# Patient Record
Sex: FEMALE | Race: WHITE | NOT HISPANIC OR LATINO
[De-identification: names, ages, dates, MRNs, and addresses within clinical notes are randomized per-mention and may not be internally consistent; named-entity substitution may affect disease eponyms.]

---

## 2017-01-03 ENCOUNTER — APPOINTMENT (OUTPATIENT)
Dept: OPHTHALMOLOGY | Facility: CLINIC | Age: 67
End: 2017-01-03

## 2017-06-06 ENCOUNTER — APPOINTMENT (OUTPATIENT)
Dept: OPHTHALMOLOGY | Facility: CLINIC | Age: 67
End: 2017-06-06

## 2017-06-06 DIAGNOSIS — H16.9 UNSPECIFIED KERATITIS: ICD-10-CM

## 2017-06-06 DIAGNOSIS — Z96.1 PRESENCE OF INTRAOCULAR LENS: ICD-10-CM

## 2017-06-13 ENCOUNTER — APPOINTMENT (OUTPATIENT)
Dept: OPHTHALMOLOGY | Facility: CLINIC | Age: 67
End: 2017-06-13

## 2018-01-05 ENCOUNTER — APPOINTMENT (OUTPATIENT)
Dept: OPHTHALMOLOGY | Facility: CLINIC | Age: 68
End: 2018-01-05
Payer: MEDICARE

## 2018-01-05 PROCEDURE — 92012 INTRM OPH EXAM EST PATIENT: CPT

## 2018-05-22 ENCOUNTER — APPOINTMENT (OUTPATIENT)
Dept: OPHTHALMOLOGY | Facility: CLINIC | Age: 68
End: 2018-05-22
Payer: MEDICARE

## 2018-05-22 PROCEDURE — 92012 INTRM OPH EXAM EST PATIENT: CPT

## 2018-06-26 ENCOUNTER — APPOINTMENT (OUTPATIENT)
Dept: OPHTHALMOLOGY | Facility: CLINIC | Age: 68
End: 2018-06-26

## 2018-09-13 ENCOUNTER — APPOINTMENT (OUTPATIENT)
Dept: HEART AND VASCULAR | Facility: CLINIC | Age: 68
End: 2018-09-13
Payer: MEDICARE

## 2018-09-13 VITALS
TEMPERATURE: 98.2 F | WEIGHT: 198 LBS | SYSTOLIC BLOOD PRESSURE: 101 MMHG | DIASTOLIC BLOOD PRESSURE: 62 MMHG | BODY MASS INDEX: 36.44 KG/M2 | HEART RATE: 79 BPM | HEIGHT: 62 IN | OXYGEN SATURATION: 99 %

## 2018-09-13 PROCEDURE — 93000 ELECTROCARDIOGRAM COMPLETE: CPT

## 2018-09-13 PROCEDURE — 99214 OFFICE O/P EST MOD 30 MIN: CPT

## 2018-09-19 ENCOUNTER — FORM ENCOUNTER (OUTPATIENT)
Age: 68
End: 2018-09-19

## 2018-09-20 ENCOUNTER — OUTPATIENT (OUTPATIENT)
Dept: OUTPATIENT SERVICES | Facility: HOSPITAL | Age: 68
LOS: 1 days | End: 2018-09-20
Payer: MEDICARE

## 2018-09-20 DIAGNOSIS — R06.09 OTHER FORMS OF DYSPNEA: ICD-10-CM

## 2018-09-20 PROCEDURE — 93351 STRESS TTE COMPLETE: CPT

## 2018-09-20 PROCEDURE — 93350 STRESS TTE ONLY: CPT | Mod: 26

## 2018-09-20 PROCEDURE — 93016 CV STRESS TEST SUPVJ ONLY: CPT

## 2018-09-20 PROCEDURE — 93325 DOPPLER ECHO COLOR FLOW MAPG: CPT | Mod: 26

## 2018-09-20 PROCEDURE — 93320 DOPPLER ECHO COMPLETE: CPT | Mod: NC,26

## 2018-09-20 PROCEDURE — 93018 CV STRESS TEST I&R ONLY: CPT

## 2018-10-01 ENCOUNTER — APPOINTMENT (OUTPATIENT)
Dept: SLEEP CENTER | Facility: HOSPITAL | Age: 68
End: 2018-10-01

## 2018-10-01 ENCOUNTER — OUTPATIENT (OUTPATIENT)
Dept: OUTPATIENT SERVICES | Facility: HOSPITAL | Age: 68
LOS: 1 days | End: 2018-10-01
Payer: MEDICARE

## 2018-10-01 DIAGNOSIS — G47.33 OBSTRUCTIVE SLEEP APNEA (ADULT) (PEDIATRIC): ICD-10-CM

## 2018-10-01 PROCEDURE — 95810 POLYSOM 6/> YRS 4/> PARAM: CPT | Mod: 26

## 2018-10-01 PROCEDURE — 95810 POLYSOM 6/> YRS 4/> PARAM: CPT

## 2018-11-07 ENCOUNTER — APPOINTMENT (OUTPATIENT)
Dept: PULMONOLOGY | Facility: CLINIC | Age: 68
End: 2018-11-07
Payer: MEDICARE

## 2018-11-07 VITALS
DIASTOLIC BLOOD PRESSURE: 88 MMHG | RESPIRATION RATE: 12 BRPM | WEIGHT: 193 LBS | OXYGEN SATURATION: 96 % | TEMPERATURE: 98.2 F | BODY MASS INDEX: 35.51 KG/M2 | SYSTOLIC BLOOD PRESSURE: 110 MMHG | HEART RATE: 71 BPM | HEIGHT: 62 IN

## 2018-11-07 DIAGNOSIS — Z23 ENCOUNTER FOR IMMUNIZATION: ICD-10-CM

## 2018-11-07 DIAGNOSIS — Z87.891 PERSONAL HISTORY OF NICOTINE DEPENDENCE: ICD-10-CM

## 2018-11-07 DIAGNOSIS — Z80.9 FAMILY HISTORY OF MALIGNANT NEOPLASM, UNSPECIFIED: ICD-10-CM

## 2018-11-07 PROCEDURE — G0008: CPT

## 2018-11-07 PROCEDURE — 90662 IIV NO PRSV INCREASED AG IM: CPT

## 2018-11-07 PROCEDURE — 99205 OFFICE O/P NEW HI 60 MIN: CPT | Mod: 25

## 2018-11-07 RX ORDER — RABEPRAZOLE SODIUM 20 MG/1
20 TABLET, DELAYED RELEASE ORAL
Refills: 0 | Status: ACTIVE | COMMUNITY
Start: 2018-11-07

## 2018-11-07 RX ORDER — QUETIAPINE 100 MG/1
100 TABLET, FILM COATED ORAL DAILY
Refills: 0 | Status: ACTIVE | COMMUNITY

## 2018-11-07 RX ORDER — CELECOXIB 200 MG/1
200 CAPSULE ORAL
Refills: 0 | Status: ACTIVE | COMMUNITY
Start: 2018-11-07

## 2018-11-07 RX ORDER — LEVOMEFOLATE/ALGAL OIL 7.5-90.314
7.5-90.314 CAPSULE ORAL
Refills: 0 | Status: ACTIVE | COMMUNITY
Start: 2018-11-07

## 2018-11-07 RX ORDER — HYOSCYAMINE SULFATE 0.12 MG/1
0.12 TABLET ORAL
Refills: 0 | Status: ACTIVE | COMMUNITY

## 2018-11-07 RX ORDER — MULTIVITAMIN
TABLET ORAL DAILY
Refills: 0 | Status: ACTIVE | COMMUNITY

## 2018-11-07 RX ORDER — RANITIDINE 300 MG/1
300 TABLET ORAL
Refills: 0 | Status: ACTIVE | COMMUNITY
Start: 2018-11-07

## 2018-11-07 RX ORDER — CLONAZEPAM 2 MG/1
2 TABLET ORAL
Refills: 0 | Status: ACTIVE | COMMUNITY
Start: 2018-11-07

## 2018-11-08 ENCOUNTER — APPOINTMENT (OUTPATIENT)
Dept: HEART AND VASCULAR | Facility: CLINIC | Age: 68
End: 2018-11-08
Payer: MEDICARE

## 2018-11-08 VITALS
HEIGHT: 61.81 IN | OXYGEN SATURATION: 98 % | TEMPERATURE: 98.4 F | SYSTOLIC BLOOD PRESSURE: 108 MMHG | BODY MASS INDEX: 36.62 KG/M2 | HEART RATE: 70 BPM | DIASTOLIC BLOOD PRESSURE: 70 MMHG | WEIGHT: 198.99 LBS

## 2018-11-08 PROCEDURE — 99214 OFFICE O/P EST MOD 30 MIN: CPT

## 2018-11-08 PROCEDURE — 93000 ELECTROCARDIOGRAM COMPLETE: CPT

## 2018-11-08 NOTE — ASSESSMENT
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  She will see Dr Coppola next week

## 2018-11-08 NOTE — PHYSICAL EXAM
[General Appearance - Well Developed] : well developed [Normal Appearance] : normal appearance [Well Groomed] : well groomed [General Appearance - Well Nourished] : well nourished [No Deformities] : no deformities [General Appearance - In No Acute Distress] : no acute distress [Normal Conjunctiva] : the conjunctiva exhibited no abnormalities [Eyelids - No Xanthelasma] : the eyelids demonstrated no xanthelasmas [Normal Oral Mucosa] : normal oral mucosa [No Oral Pallor] : no oral pallor [No Oral Cyanosis] : no oral cyanosis [Normal Jugular Venous A Waves Present] : normal jugular venous A waves present [Normal Jugular Venous V Waves Present] : normal jugular venous V waves present [No Jugular Venous Hull A Waves] : no jugular venous hull A waves [Respiration, Rhythm And Depth] : normal respiratory rhythm and effort [Exaggerated Use Of Accessory Muscles For Inspiration] : no accessory muscle use [Auscultation Breath Sounds / Voice Sounds] : lungs were clear to auscultation bilaterally [Abdomen Soft] : soft [Abdomen Tenderness] : non-tender [Abdomen Mass (___ Cm)] : no abdominal mass palpated [Abnormal Walk] : normal gait [Gait - Sufficient For Exercise Testing] : the gait was sufficient for exercise testing [Nail Clubbing] : no clubbing of the fingernails [Cyanosis, Localized] : no localized cyanosis [Petechial Hemorrhages (___cm)] : no petechial hemorrhages [] : no ischemic changes [FreeTextEntry1] : Scleraderma skin changes

## 2018-11-08 NOTE — HISTORY OF PRESENT ILLNESS
[FreeTextEntry1] : Seen by Dr Berger for exercise induced PH on stress echo.  Resting PASP 40, and post 60.  CT without RV enlargement.  PA not enlarged\par \par He suggests right heart cath.

## 2018-11-08 NOTE — REVIEW OF SYSTEMS
[Feeling Fatigued] : feeling fatigued [Blurry Vision] : blurred vision [Shortness Of Breath] : shortness of breath [see HPI] : see HPI [Joint Pain] : joint pain [Negative] : Genitourinary

## 2018-11-15 ENCOUNTER — APPOINTMENT (OUTPATIENT)
Dept: OPHTHALMOLOGY | Facility: CLINIC | Age: 68
End: 2018-11-15

## 2018-11-15 ENCOUNTER — APPOINTMENT (OUTPATIENT)
Dept: OPHTHALMOLOGY | Facility: CLINIC | Age: 68
End: 2018-11-15
Payer: MEDICARE

## 2018-11-15 ENCOUNTER — OUTPATIENT (OUTPATIENT)
Dept: OUTPATIENT SERVICES | Facility: HOSPITAL | Age: 68
LOS: 1 days | End: 2018-11-15

## 2018-11-15 DIAGNOSIS — H26.491 OTHER SECONDARY CATARACT, RIGHT EYE: ICD-10-CM

## 2018-11-15 PROCEDURE — 66821 AFTER CATARACT LASER SURGERY: CPT | Mod: RT

## 2018-11-16 DIAGNOSIS — H26.491 OTHER SECONDARY CATARACT, RIGHT EYE: ICD-10-CM

## 2018-11-19 PROBLEM — H26.491 POSTERIOR CAPSULAR OPACIFICATION, RIGHT: Status: ACTIVE | Noted: 2018-05-22

## 2018-12-04 ENCOUNTER — APPOINTMENT (OUTPATIENT)
Dept: OPHTHALMOLOGY | Facility: CLINIC | Age: 68
End: 2018-12-04
Payer: MEDICARE

## 2018-12-04 PROCEDURE — 92012 INTRM OPH EXAM EST PATIENT: CPT | Mod: 24

## 2019-01-03 ENCOUNTER — APPOINTMENT (OUTPATIENT)
Dept: HEART AND VASCULAR | Facility: CLINIC | Age: 69
End: 2019-01-03
Payer: MEDICARE

## 2019-01-03 VITALS
HEIGHT: 61.81 IN | WEIGHT: 198 LBS | HEART RATE: 78 BPM | SYSTOLIC BLOOD PRESSURE: 106 MMHG | TEMPERATURE: 98.3 F | BODY MASS INDEX: 36.44 KG/M2 | OXYGEN SATURATION: 98 % | DIASTOLIC BLOOD PRESSURE: 69 MMHG

## 2019-01-03 PROCEDURE — 99214 OFFICE O/P EST MOD 30 MIN: CPT

## 2019-01-03 NOTE — HISTORY OF PRESENT ILLNESS
[FreeTextEntry1] : Fe def without anemia.Sent to a hematologist.  Received 5 infusions of IV Fe.  Last infusion 3 weeks ago.  First 3 weekly and then 2nd 2  in 2 days.  Within 72 hours, an improved sense of well being.  Modest improvement on the treadmill as well.  On the treadmill Mets have increased from 7 to 8.5.  Ability to walk distances is markedly improved as less muscle fatigue.  Can walk 1 1/2 flights of stairs.\par \par Her hoarseness is getting worse.  glottic insufficiency.  Very paretic vocal cord.

## 2019-01-03 NOTE — REVIEW OF SYSTEMS
[Recent Weight Gain (___ Lbs)] : recent [unfilled] ~Ulb weight gain [Feeling Fatigued] : feeling fatigued [Blurry Vision] : blurred vision [Shortness Of Breath] : shortness of breath [Dyspnea on exertion] : dyspnea during exertion [Heartburn] : heartburn [Joint Pain] : joint pain [Muscle Cramps] : muscle cramps [Negative] : Neurological

## 2019-01-03 NOTE — ASSESSMENT
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   Fe def now with repletion.  Will have glottis fixed as well\par \par Plan:  To see me in 2 months and will assess timing of stress echo.  April, May, June?

## 2019-01-03 NOTE — PHYSICAL EXAM
[General Appearance - In No Acute Distress] : no acute distress [Normal Conjunctiva] : the conjunctiva exhibited no abnormalities [Eyelids - No Xanthelasma] : the eyelids demonstrated no xanthelasmas [Normal Oral Mucosa] : normal oral mucosa [No Oral Pallor] : no oral pallor [No Oral Cyanosis] : no oral cyanosis [Normal Jugular Venous A Waves Present] : normal jugular venous A waves present [Normal Jugular Venous V Waves Present] : normal jugular venous V waves present [No Jugular Venous Hull A Waves] : no jugular venous hull A waves [Respiration, Rhythm And Depth] : normal respiratory rhythm and effort [Exaggerated Use Of Accessory Muscles For Inspiration] : no accessory muscle use [Auscultation Breath Sounds / Voice Sounds] : lungs were clear to auscultation bilaterally [Heart Rate And Rhythm] : heart rate and rhythm were normal [Heart Sounds] : normal S1 and S2 [Murmurs] : no murmurs present [Abdomen Soft] : soft [Abdomen Tenderness] : non-tender [] : no hepato-splenomegaly [Abdomen Mass (___ Cm)] : no abdominal mass palpated [Abnormal Walk] : normal gait [Gait - Sufficient For Exercise Testing] : the gait was sufficient for exercise testing [FreeTextEntry1] : non pitting edema

## 2019-03-07 ENCOUNTER — APPOINTMENT (OUTPATIENT)
Dept: HEART AND VASCULAR | Facility: CLINIC | Age: 69
End: 2019-03-07
Payer: MEDICARE

## 2019-03-07 VITALS
OXYGEN SATURATION: 100 % | TEMPERATURE: 98.4 F | HEIGHT: 61.81 IN | HEART RATE: 67 BPM | WEIGHT: 184.99 LBS | DIASTOLIC BLOOD PRESSURE: 80 MMHG | SYSTOLIC BLOOD PRESSURE: 90 MMHG | BODY MASS INDEX: 34.04 KG/M2

## 2019-03-07 PROCEDURE — 99214 OFFICE O/P EST MOD 30 MIN: CPT

## 2019-03-07 NOTE — ASSESSMENT
[FreeTextEntry1] : ECG:  SR,  67\par \par Imp:  Dr sandra with scleraderma, exercise induced PH, esophageal dys,  Fe def.  Exercise is improved and symptoms with Iron infusions\par \par Plan:  will repeat stress echo for exercise induced PH

## 2019-03-07 NOTE — REVIEW OF SYSTEMS
[Feeling Fatigued] : feeling fatigued [Recent Weight Loss (___ Lbs)] : recent [unfilled] ~Ulb weight loss [Blurry Vision] : blurred vision [Shortness Of Breath] : shortness of breath [Dysphagia] : dysphagia [Joint Pain] : joint pain [Negative] : Heme/Lymph

## 2019-03-07 NOTE — HISTORY OF PRESENT ILLNESS
[FreeTextEntry1] : Last here in January.   She is good.  She feels much better.  Exercise tolerance is subjectively much better.  work in [employer]s have increased.  Walking 2 miles with new puppy. Was able to stop eating junk food.  She is down 10 lbs.  \par \par Hgb a1c5, glottic insuff taken care of by Dr Hdez of ENT, head of [place] voice unit.  Percutaneous injection to paralyzed cord to medialize it.  \par \par Transferrin sat now 34,  Ferritin 80, received 5 infusions of Ferritin.  Last Fe 1 month ago.  \par \par LDH elevated to 350 and uric acid elevated as well\par \par Had been fructose intol and now tried an apple and thus far ok.

## 2019-03-07 NOTE — PHYSICAL EXAM
[General Appearance - Well Developed] : well developed [Normal Appearance] : normal appearance [Well Groomed] : well groomed [General Appearance - Well Nourished] : well nourished [No Deformities] : no deformities [General Appearance - In No Acute Distress] : no acute distress [Normal Conjunctiva] : the conjunctiva exhibited no abnormalities [Eyelids - No Xanthelasma] : the eyelids demonstrated no xanthelasmas [Normal Oral Mucosa] : normal oral mucosa [No Oral Pallor] : no oral pallor [No Oral Cyanosis] : no oral cyanosis [Normal Jugular Venous A Waves Present] : normal jugular venous A waves present [Normal Jugular Venous V Waves Present] : normal jugular venous V waves present [No Jugular Venous Hull A Waves] : no jugular venous hull A waves [Respiration, Rhythm And Depth] : normal respiratory rhythm and effort [Exaggerated Use Of Accessory Muscles For Inspiration] : no accessory muscle use [Auscultation Breath Sounds / Voice Sounds] : lungs were clear to auscultation bilaterally [Heart Rate And Rhythm] : heart rate and rhythm were normal [Heart Sounds] : normal S1 and S2 [Murmurs] : no murmurs present [Abdomen Soft] : soft [Abdomen Tenderness] : non-tender [Abdomen Mass (___ Cm)] : no abdominal mass palpated [Abnormal Walk] : normal gait [Gait - Sufficient For Exercise Testing] : the gait was sufficient for exercise testing [Skin Color & Pigmentation] : normal skin color and pigmentation [] : no rash [No Venous Stasis] : no venous stasis [Skin Lesions] : no skin lesions [No Skin Ulcers] : no skin ulcer [No Xanthoma] : no  xanthoma was observed [Oriented To Time, Place, And Person] : oriented to person, place, and time [Affect] : the affect was normal [Mood] : the mood was normal [No Anxiety] : not feeling anxious [FreeTextEntry1] : non pitting edema

## 2019-04-22 ENCOUNTER — APPOINTMENT (OUTPATIENT)
Dept: OPHTHALMOLOGY | Facility: CLINIC | Age: 69
End: 2019-04-22
Payer: MEDICARE

## 2019-04-22 PROCEDURE — 92014 COMPRE OPH EXAM EST PT 1/>: CPT

## 2019-04-26 ENCOUNTER — APPOINTMENT (OUTPATIENT)
Dept: OPHTHALMOLOGY | Facility: CLINIC | Age: 69
End: 2019-04-26

## 2019-04-27 ENCOUNTER — TRANSCRIPTION ENCOUNTER (OUTPATIENT)
Age: 69
End: 2019-04-27

## 2019-04-30 ENCOUNTER — APPOINTMENT (OUTPATIENT)
Dept: OPHTHALMOLOGY | Facility: CLINIC | Age: 69
End: 2019-04-30

## 2019-05-01 ENCOUNTER — FORM ENCOUNTER (OUTPATIENT)
Age: 69
End: 2019-05-01

## 2019-05-02 ENCOUNTER — OUTPATIENT (OUTPATIENT)
Dept: OUTPATIENT SERVICES | Facility: HOSPITAL | Age: 69
LOS: 1 days | End: 2019-05-02
Payer: MEDICARE

## 2019-05-02 DIAGNOSIS — I27.20 PULMONARY HYPERTENSION, UNSPECIFIED: ICD-10-CM

## 2019-05-02 PROCEDURE — 93350 STRESS TTE ONLY: CPT | Mod: 26

## 2019-05-02 PROCEDURE — 93320 DOPPLER ECHO COMPLETE: CPT | Mod: 26

## 2019-05-02 PROCEDURE — 93018 CV STRESS TEST I&R ONLY: CPT

## 2019-05-02 PROCEDURE — 93016 CV STRESS TEST SUPVJ ONLY: CPT

## 2019-05-02 PROCEDURE — 93351 STRESS TTE COMPLETE: CPT

## 2019-05-02 PROCEDURE — 93325 DOPPLER ECHO COLOR FLOW MAPG: CPT | Mod: 26

## 2019-05-16 ENCOUNTER — APPOINTMENT (OUTPATIENT)
Dept: HEART AND VASCULAR | Facility: CLINIC | Age: 69
End: 2019-05-16
Payer: MEDICARE

## 2019-05-16 VITALS
DIASTOLIC BLOOD PRESSURE: 62 MMHG | HEIGHT: 61.81 IN | SYSTOLIC BLOOD PRESSURE: 100 MMHG | BODY MASS INDEX: 32.76 KG/M2 | HEART RATE: 61 BPM | OXYGEN SATURATION: 100 % | TEMPERATURE: 98 F | WEIGHT: 178 LBS

## 2019-05-16 PROCEDURE — 93000 ELECTROCARDIOGRAM COMPLETE: CPT

## 2019-05-16 PROCEDURE — 99214 OFFICE O/P EST MOD 30 MIN: CPT

## 2019-05-16 NOTE — HISTORY OF PRESENT ILLNESS
[FreeTextEntry1] : Saw Dr Coppola.  Concernedre:  tachyphylaxis to fe infusions.  She suggests an echo in 3 months.  \par \par Stress echo with sig improvements.  PAP down to 23, and with exercise 40 and exercise time increase from 6 mins to 11 and exercise 7 mets to 10.  This is also with consistent with Ferritin increases to 80, no additional infusions.  \par \par Continues to exercise more.  Is walking 5-6 miles 3 x a week.  Lost about 17 lbs.  Lowering carbs and eating less.  \par \par She feels better than she has felt in a long time.  \par \par

## 2019-05-16 NOTE — REVIEW OF SYSTEMS
[Feeling Fatigued] : feeling fatigued [Dyspnea on exertion] : dyspnea during exertion [Recent Weight Loss (___ Lbs)] : recent [unfilled] ~Ulb weight loss [Joint Pain] : joint pain [Negative] : Heme/Lymph

## 2019-05-16 NOTE — ASSESSMENT
[FreeTextEntry1] : ECG:  Sr, 61, low voltage\par \par Imp:  Dr Macias, with h/o scleraderma and developed Pulm Htn, however, Fe loss, and now with Fe infusions and ferritin replete.  Repeat stress echo with marked improvements as detailed in HPI\par \par Plan:  Excellent news.  Lipids by Dr Morris, stress echo in September, PFTs in September by Dr Coppola and will see me in October.

## 2019-05-16 NOTE — PHYSICAL EXAM
[General Appearance - Well Developed] : well developed [Well Groomed] : well groomed [Normal Appearance] : normal appearance [General Appearance - Well Nourished] : well nourished [No Deformities] : no deformities [General Appearance - In No Acute Distress] : no acute distress [Normal Conjunctiva] : the conjunctiva exhibited no abnormalities [Eyelids - No Xanthelasma] : the eyelids demonstrated no xanthelasmas [Normal Oral Mucosa] : normal oral mucosa [No Oral Pallor] : no oral pallor [Normal Jugular Venous V Waves Present] : normal jugular venous V waves present [No Oral Cyanosis] : no oral cyanosis [Normal Jugular Venous A Waves Present] : normal jugular venous A waves present [No Jugular Venous Hull A Waves] : no jugular venous hull A waves [Respiration, Rhythm And Depth] : normal respiratory rhythm and effort [Heart Rate And Rhythm] : heart rate and rhythm were normal [Exaggerated Use Of Accessory Muscles For Inspiration] : no accessory muscle use [Auscultation Breath Sounds / Voice Sounds] : lungs were clear to auscultation bilaterally [Heart Sounds] : normal S1 and S2 [Murmurs] : no murmurs present [Abdomen Soft] : soft [] : no hepato-splenomegaly [Abdomen Tenderness] : non-tender [Abdomen Mass (___ Cm)] : no abdominal mass palpated [Abnormal Walk] : normal gait [Gait - Sufficient For Exercise Testing] : the gait was sufficient for exercise testing [FreeTextEntry1] : neck skin changes

## 2019-05-23 ENCOUNTER — OUTPATIENT (OUTPATIENT)
Dept: OUTPATIENT SERVICES | Facility: HOSPITAL | Age: 69
LOS: 1 days | End: 2019-05-23

## 2019-05-23 ENCOUNTER — APPOINTMENT (OUTPATIENT)
Dept: OPHTHALMOLOGY | Facility: CLINIC | Age: 69
End: 2019-05-23

## 2019-05-23 ENCOUNTER — APPOINTMENT (OUTPATIENT)
Dept: OPHTHALMOLOGY | Facility: CLINIC | Age: 69
End: 2019-05-23
Payer: MEDICARE

## 2019-05-23 DIAGNOSIS — H26.492 OTHER SECONDARY CATARACT, LEFT EYE: ICD-10-CM

## 2019-05-23 PROCEDURE — 66821 AFTER CATARACT LASER SURGERY: CPT | Mod: LT

## 2019-05-24 DIAGNOSIS — H26.492 OTHER SECONDARY CATARACT, LEFT EYE: ICD-10-CM

## 2019-06-11 ENCOUNTER — NON-APPOINTMENT (OUTPATIENT)
Age: 69
End: 2019-06-11

## 2019-06-11 ENCOUNTER — APPOINTMENT (OUTPATIENT)
Dept: OPHTHALMOLOGY | Facility: CLINIC | Age: 69
End: 2019-06-11
Payer: MEDICARE

## 2019-06-11 PROCEDURE — 99024 POSTOP FOLLOW-UP VISIT: CPT

## 2019-09-08 ENCOUNTER — FORM ENCOUNTER (OUTPATIENT)
Age: 69
End: 2019-09-08

## 2019-09-09 ENCOUNTER — OUTPATIENT (OUTPATIENT)
Dept: OUTPATIENT SERVICES | Facility: HOSPITAL | Age: 69
LOS: 1 days | End: 2019-09-09
Payer: MEDICARE

## 2019-09-09 DIAGNOSIS — I27.20 PULMONARY HYPERTENSION, UNSPECIFIED: ICD-10-CM

## 2019-09-09 PROCEDURE — 93351 STRESS TTE COMPLETE: CPT | Mod: 26,52

## 2019-09-09 PROCEDURE — 93351 STRESS TTE COMPLETE: CPT

## 2019-10-10 ENCOUNTER — APPOINTMENT (OUTPATIENT)
Dept: HEART AND VASCULAR | Facility: CLINIC | Age: 69
End: 2019-10-10
Payer: MEDICARE

## 2019-10-10 VITALS
DIASTOLIC BLOOD PRESSURE: 60 MMHG | SYSTOLIC BLOOD PRESSURE: 90 MMHG | WEIGHT: 184.99 LBS | HEIGHT: 61.81 IN | BODY MASS INDEX: 34.04 KG/M2 | HEART RATE: 75 BPM | TEMPERATURE: 97.9 F | OXYGEN SATURATION: 98 %

## 2019-10-10 PROCEDURE — 99214 OFFICE O/P EST MOD 30 MIN: CPT

## 2019-10-10 PROCEDURE — 93000 ELECTROCARDIOGRAM COMPLETE: CPT

## 2019-10-10 NOTE — HISTORY OF PRESENT ILLNESS
[FreeTextEntry1] : Last seen in May with improvements in Echo PAP and exercise and stess test. Had been receiving ferritin infusions.  No further ferritin infusions.   Most recent ferritin, hanging at 70, highest was 80, lowest was 19.  \par \par PFTs in September:  Normal study 5% increase in diffusing capacity as well as increase in FVC.  Saw Dr Coppola.   \par \par Stess echo:  Exercised 97% MPHR.  LVEF 60-70% with exercise.  PAP 22-43.  Says RV mildly dilated with exercise.\par \par Feels pretty well.  Walks 5-6 miles a day.  She is moving so working a lot the apartment.  doing fine.  feeling good.  Walks her dog daily.  Walked the park up hill yesterday.  Quick recovery.  Breathing is fine.  \par \par

## 2019-10-10 NOTE — ASSESSMENT
[FreeTextEntry1] : ECG:SR, lAD, low voltage\par \par Imp:  Ms Gilberto with scleraderma, PAH, now post Fe infusions with improvement in parameters\par \par Plan:  To see Dr Morris bloods and consider next infusion.  Echo in 6 months

## 2019-10-10 NOTE — REVIEW OF SYSTEMS
[Recent Weight Gain (___ Lbs)] : recent [unfilled] ~Ulb weight gain [Feeling Fatigued] : feeling fatigued [Joint Pain] : joint pain [Negative] : Heme/Lymph

## 2020-03-10 ENCOUNTER — FORM ENCOUNTER (OUTPATIENT)
Age: 70
End: 2020-03-10

## 2020-03-11 ENCOUNTER — OUTPATIENT (OUTPATIENT)
Dept: OUTPATIENT SERVICES | Facility: HOSPITAL | Age: 70
LOS: 1 days | End: 2020-03-11
Payer: MEDICARE

## 2020-03-11 DIAGNOSIS — I27.20 PULMONARY HYPERTENSION, UNSPECIFIED: ICD-10-CM

## 2020-03-11 PROCEDURE — 93306 TTE W/DOPPLER COMPLETE: CPT | Mod: 26

## 2020-03-11 PROCEDURE — 93306 TTE W/DOPPLER COMPLETE: CPT

## 2020-07-09 ENCOUNTER — APPOINTMENT (OUTPATIENT)
Dept: HEART AND VASCULAR | Facility: CLINIC | Age: 70
End: 2020-07-09
Payer: MEDICARE

## 2020-07-09 VITALS
SYSTOLIC BLOOD PRESSURE: 94 MMHG | TEMPERATURE: 98.5 F | DIASTOLIC BLOOD PRESSURE: 70 MMHG | HEART RATE: 60 BPM | WEIGHT: 189 LBS | OXYGEN SATURATION: 99 % | BODY MASS INDEX: 34.78 KG/M2 | HEIGHT: 61.81 IN

## 2020-07-09 PROCEDURE — 93000 ELECTROCARDIOGRAM COMPLETE: CPT

## 2020-07-09 PROCEDURE — 99214 OFFICE O/P EST MOD 30 MIN: CPT

## 2020-07-09 NOTE — HISTORY OF PRESENT ILLNESS
[FreeTextEntry1] : Here in October.  Had a rest echo on March 11, read as normal, no evidence of PH\par \par Seems to get in trouble when Fe sat is 20% or less and ferritin is below 30, with increasing subjective exertional dyspnea, received Fe infusion\par \par Gained 5 lbs, no exercise, perhaps why dyspnea\par \par Phone visit with Anat will do echo\par \par Will see Kaleigh Morris tomorrow and labs done tomorrow\par \par Not COVID tested, had minimally febrile illness in February with myalgias\par \par has had hyperlipidemia with LDL of 190 and has been intolerant to statins and CTA negative and no family hx

## 2020-07-09 NOTE — ASSESSMENT
[FreeTextEntry1] : ECG:R, low voltage, no change\par \par Imp:  Scleraderma, mild PH, exercise induced.  Some fluctuations in sx and iron.  LDL last 190, will need tx\par \par Plan:  labs by Dr Morris.  decide on tx.  will repeat echo .  See in 6 months after Dr Coppola

## 2020-07-09 NOTE — PHYSICAL EXAM
[General Appearance - Well Developed] : well developed [Normal Appearance] : normal appearance [General Appearance - Well Nourished] : well nourished [Well Groomed] : well groomed [General Appearance - In No Acute Distress] : no acute distress [No Deformities] : no deformities [Normal Conjunctiva] : the conjunctiva exhibited no abnormalities [Eyelids - No Xanthelasma] : the eyelids demonstrated no xanthelasmas [Normal Oral Mucosa] : normal oral mucosa [No Oral Pallor] : no oral pallor [No Oral Cyanosis] : no oral cyanosis [Normal Jugular Venous A Waves Present] : normal jugular venous A waves present [Normal Jugular Venous V Waves Present] : normal jugular venous V waves present [No Jugular Venous Hull A Waves] : no jugular venous hull A waves [Respiration, Rhythm And Depth] : normal respiratory rhythm and effort [Exaggerated Use Of Accessory Muscles For Inspiration] : no accessory muscle use [Auscultation Breath Sounds / Voice Sounds] : lungs were clear to auscultation bilaterally [Heart Rate And Rhythm] : heart rate and rhythm were normal [Heart Sounds] : normal S1 and S2 [Murmurs] : no murmurs present [Abdomen Soft] : soft [] : no hepato-splenomegaly [Abdomen Tenderness] : non-tender [FreeTextEntry1] : non pitting edema [Abdomen Mass (___ Cm)] : no abdominal mass palpated

## 2020-10-01 ENCOUNTER — APPOINTMENT (OUTPATIENT)
Dept: HEART AND VASCULAR | Facility: CLINIC | Age: 70
End: 2020-10-01
Payer: MEDICARE

## 2020-10-01 VITALS
WEIGHT: 195 LBS | HEART RATE: 70 BPM | SYSTOLIC BLOOD PRESSURE: 100 MMHG | TEMPERATURE: 98.4 F | HEIGHT: 62 IN | BODY MASS INDEX: 35.88 KG/M2 | DIASTOLIC BLOOD PRESSURE: 70 MMHG

## 2020-10-01 PROCEDURE — 93000 ELECTROCARDIOGRAM COMPLETE: CPT

## 2020-10-01 PROCEDURE — 99214 OFFICE O/P EST MOD 30 MIN: CPT

## 2020-10-01 NOTE — PHYSICAL EXAM
[General Appearance - Well Developed] : well developed [Normal Appearance] : normal appearance [Well Groomed] : well groomed [General Appearance - Well Nourished] : well nourished [No Deformities] : no deformities [General Appearance - In No Acute Distress] : no acute distress [Normal Conjunctiva] : the conjunctiva exhibited no abnormalities [Eyelids - No Xanthelasma] : the eyelids demonstrated no xanthelasmas [Normal Oral Mucosa] : normal oral mucosa [No Oral Pallor] : no oral pallor [No Oral Cyanosis] : no oral cyanosis [Normal Jugular Venous A Waves Present] : normal jugular venous A waves present [Normal Jugular Venous V Waves Present] : normal jugular venous V waves present [No Jugular Venous Hull A Waves] : no jugular venous hull A waves [Respiration, Rhythm And Depth] : normal respiratory rhythm and effort [Exaggerated Use Of Accessory Muscles For Inspiration] : no accessory muscle use [Auscultation Breath Sounds / Voice Sounds] : lungs were clear to auscultation bilaterally [Heart Rate And Rhythm] : heart rate and rhythm were normal [Heart Sounds] : normal S1 and S2 [Murmurs] : no murmurs present [Abdomen Soft] : soft [Abdomen Tenderness] : non-tender [Abdomen Mass (___ Cm)] : no abdominal mass palpated [Abnormal Walk] : normal gait [Gait - Sufficient For Exercise Testing] : the gait was sufficient for exercise testing [Skin Color & Pigmentation] : normal skin color and pigmentation [] : no rash [No Venous Stasis] : no venous stasis [Skin Lesions] : no skin lesions [No Skin Ulcers] : no skin ulcer [No Xanthoma] : no  xanthoma was observed [FreeTextEntry1] : non pitting edema

## 2020-10-01 NOTE — REVIEW OF SYSTEMS
[Feeling Fatigued] : feeling fatigued [Shortness Of Breath] : shortness of breath [Dysphagia] : dysphagia [Negative] : Heme/Lymph

## 2020-10-01 NOTE — ASSESSMENT
[FreeTextEntry1] : ECG:  SR 70.  low voltage no change./\par \par Imp:  PH with scleraderma improved with iron repletion.  On Lisinopril\par GERD.  \par \par Plan:  see in 6 mos

## 2020-11-06 ENCOUNTER — APPOINTMENT (OUTPATIENT)
Dept: OPHTHALMOLOGY | Facility: CLINIC | Age: 70
End: 2020-11-06
Payer: MEDICARE

## 2020-11-06 ENCOUNTER — NON-APPOINTMENT (OUTPATIENT)
Age: 70
End: 2020-11-06

## 2020-11-06 PROCEDURE — 92012 INTRM OPH EXAM EST PATIENT: CPT

## 2021-04-08 ENCOUNTER — APPOINTMENT (OUTPATIENT)
Dept: HEART AND VASCULAR | Facility: CLINIC | Age: 71
End: 2021-04-08
Payer: MEDICARE

## 2021-04-08 ENCOUNTER — NON-APPOINTMENT (OUTPATIENT)
Age: 71
End: 2021-04-08

## 2021-04-08 VITALS
BODY MASS INDEX: 34.78 KG/M2 | SYSTOLIC BLOOD PRESSURE: 100 MMHG | RESPIRATION RATE: 12 BRPM | TEMPERATURE: 96 F | OXYGEN SATURATION: 99 % | HEART RATE: 70 BPM | WEIGHT: 189 LBS | DIASTOLIC BLOOD PRESSURE: 70 MMHG | HEIGHT: 62 IN

## 2021-04-08 PROCEDURE — 93000 ELECTROCARDIOGRAM COMPLETE: CPT

## 2021-04-08 PROCEDURE — 99215 OFFICE O/P EST HI 40 MIN: CPT

## 2021-04-08 NOTE — ASSESSMENT
[FreeTextEntry1] : ECG:  SR 60\par \par Imp:  Dr Macias with BLAIR, high LDL, cannot exercise on treadmill, need CTA to assess for CAD

## 2021-04-08 NOTE — HISTORY OF PRESENT ILLNESS
[FreeTextEntry1] : Last seen in October, \par \par Worsening exertional dyspnea, up incline and stairs.  Fe checked and steady, Saw Dr Coppola in March \par \par Echo EF 65%, PAP normal and RA pressure normal\par \par PFTs are mostly the same\par \par Voice is more hoarse, laryngoscopy, worse glottal insufficiency, there is a difinitive procedure that they fill the area like a valve and she has decided to do that\par \par DR [facility] at [facility]:  laryngoplasty, thinks may be contributing to shortness of breath\par \par Nodule in the RLL there is a change, Radiologist believes there is a change, has grown, Anat thinks it has grown, 0.9 to 1.3, may be malignancy\par \par Scheduled for bx, but its deep in the lower lobe, so may go for wedge resection Maybe adeno in situ or minimally invasive. \par \par Appointment with Dr Morris, for thoracic surgery for VATS and robotic, next week.  \par \par She is walking, slowly, no chest discomfort.\par \par CTA a couple of years ago

## 2021-04-08 NOTE — PHYSICAL EXAM
[General Appearance - Well Developed] : well developed [Normal Appearance] : normal appearance [Well Groomed] : well groomed [General Appearance - Well Nourished] : well nourished [No Deformities] : no deformities [General Appearance - In No Acute Distress] : no acute distress [Normal Conjunctiva] : the conjunctiva exhibited no abnormalities [Eyelids - No Xanthelasma] : the eyelids demonstrated no xanthelasmas [Normal Oral Mucosa] : normal oral mucosa [No Oral Pallor] : no oral pallor [No Oral Cyanosis] : no oral cyanosis [Normal Jugular Venous A Waves Present] : normal jugular venous A waves present [Normal Jugular Venous V Waves Present] : normal jugular venous V waves present [No Jugular Venous Hull A Waves] : no jugular venous hull A waves [Respiration, Rhythm And Depth] : normal respiratory rhythm and effort [Exaggerated Use Of Accessory Muscles For Inspiration] : no accessory muscle use [Auscultation Breath Sounds / Voice Sounds] : lungs were clear to auscultation bilaterally [Heart Rate And Rhythm] : heart rate and rhythm were normal [Murmurs] : no murmurs present [Abdomen Soft] : soft [Abdomen Tenderness] : non-tender [Abdomen Mass (___ Cm)] : no abdominal mass palpated [Abnormal Walk] : normal gait [Gait - Sufficient For Exercise Testing] : the gait was sufficient for exercise testing [Nail Clubbing] : no clubbing of the fingernails [Cyanosis, Localized] : no localized cyanosis [Petechial Hemorrhages (___cm)] : no petechial hemorrhages [] : no ischemic changes [FreeTextEntry1] : S4

## 2021-04-14 ENCOUNTER — RESULT REVIEW (OUTPATIENT)
Age: 71
End: 2021-04-14

## 2021-04-14 ENCOUNTER — APPOINTMENT (OUTPATIENT)
Dept: CT IMAGING | Facility: CLINIC | Age: 71
End: 2021-04-14
Payer: MEDICARE

## 2021-04-14 ENCOUNTER — OUTPATIENT (OUTPATIENT)
Dept: OUTPATIENT SERVICES | Facility: HOSPITAL | Age: 71
LOS: 1 days | End: 2021-04-14

## 2021-04-14 PROCEDURE — G1004: CPT

## 2021-04-14 PROCEDURE — 75574 CT ANGIO HRT W/3D IMAGE: CPT | Mod: 26,ME

## 2021-10-18 ENCOUNTER — APPOINTMENT (OUTPATIENT)
Dept: HEART AND VASCULAR | Facility: CLINIC | Age: 71
End: 2021-10-18
Payer: MEDICARE

## 2021-10-18 ENCOUNTER — NON-APPOINTMENT (OUTPATIENT)
Age: 71
End: 2021-10-18

## 2021-10-18 VITALS
TEMPERATURE: 98.8 F | BODY MASS INDEX: 29.63 KG/M2 | WEIGHT: 161 LBS | SYSTOLIC BLOOD PRESSURE: 92 MMHG | OXYGEN SATURATION: 98 % | DIASTOLIC BLOOD PRESSURE: 65 MMHG | HEART RATE: 78 BPM | HEIGHT: 62 IN

## 2021-10-18 LAB
ALBUMIN SERPL ELPH-MCNC: 4.8 G/DL
ALP BLD-CCNC: 87 U/L
ALT SERPL-CCNC: 13 U/L
ANION GAP SERPL CALC-SCNC: 13 MMOL/L
AST SERPL-CCNC: 21 U/L
BILIRUB SERPL-MCNC: 0.9 MG/DL
BUN SERPL-MCNC: 22 MG/DL
CALCIUM SERPL-MCNC: 10.5 MG/DL
CHLORIDE SERPL-SCNC: 102 MMOL/L
CHOLEST SERPL-MCNC: 290 MG/DL
CO2 SERPL-SCNC: 25 MMOL/L
CREAT SERPL-MCNC: 0.88 MG/DL
ESTIMATED AVERAGE GLUCOSE: 100 MG/DL
GLUCOSE SERPL-MCNC: 85 MG/DL
HBA1C MFR BLD HPLC: 5.1 %
HDLC SERPL-MCNC: 57 MG/DL
LDLC SERPL CALC-MCNC: 209 MG/DL
NONHDLC SERPL-MCNC: 233 MG/DL
POTASSIUM SERPL-SCNC: 4.7 MMOL/L
PROT SERPL-MCNC: 7.1 G/DL
SODIUM SERPL-SCNC: 140 MMOL/L
TRIGL SERPL-MCNC: 122 MG/DL

## 2021-10-18 PROCEDURE — 93000 ELECTROCARDIOGRAM COMPLETE: CPT

## 2021-10-18 PROCEDURE — 99214 OFFICE O/P EST MOD 30 MIN: CPT

## 2021-10-18 NOTE — REVIEW OF SYSTEMS
[Feeling Fatigued] : feeling fatigued [SOB] : shortness of breath [Dyspnea on exertion] : dyspnea during exertion [Abdominal Pain] : abdominal pain [Joint Pain] : joint pain [Negative] : Heme/Lymph

## 2021-10-18 NOTE — PHYSICAL EXAM
[Well Developed] : well developed [Well Nourished] : well nourished [No Acute Distress] : no acute distress [Normal Conjunctiva] : normal conjunctiva [Normal Venous Pressure] : normal venous pressure [No Carotid Bruit] : no carotid bruit [Normal S1, S2] : normal S1, S2 [No Murmur] : no murmur [No Rub] : no rub [No Gallop] : no gallop [Good Air Entry] : good air entry [No Respiratory Distress] : no respiratory distress  [Soft] : abdomen soft [Non Tender] : non-tender [No Masses/organomegaly] : no masses/organomegaly [Normal Bowel Sounds] : normal bowel sounds [Normal Gait] : normal gait [No Cyanosis] : no cyanosis [No Clubbing] : no clubbing [No Varicosities] : no varicosities [No Rash] : no rash [No Skin Lesions] : no skin lesions [Moves all extremities] : moves all extremities [No Focal Deficits] : no focal deficits [Normal Speech] : normal speech [Alert and Oriented] : alert and oriented [Normal memory] : normal memory [de-identified] : reduced BS right base [de-identified] : tr edema

## 2021-10-18 NOTE — ASSESSMENT
[FreeTextEntry1] : ECG:  SR, no change\par \par \par \par Imp:  Scleroderma, PH, improved,  recent lobectomy for lung ca.  h/H and Fe now improved and exercise tolerance back to good.\par \par Plan:  will stay on aspirin as pro coagulant part of profile. Stress echo by Dr. Coppola, \par \par colonoscopy, will clear and check chol and tx,CTA was ) and normal. May consider Zetia alone +/- bempedoic acid

## 2021-10-18 NOTE — HISTORY OF PRESENT ILLNESS
[FreeTextEntry1] : [facility] for thoracic surgery,  April 2021.  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  Likelihood of local or LN recurrence is low.  \par \par Hgb went to 10.9/32.9\par \par Fe sat of 11\par \par Developed severe exercise intolerance after surgery.  Went to hematologist, set up for Fe infusions, through July and August.  \par \par Dr Coppola did a rest echo, read as normal.  Will have a stress echo next week.including PAP and ventricular sizes, and normal diastolic function\par \par Hgb 12.7/ 37.3, Fe sat 45%, and now exercise tolerance is great.  \par \par Fever for several weeks after surgery,  down 30lbs of weight from max.\par \par Feels pretty good.  Mostly walking, 2-5 miles a day, \par \par She is on ASA, has a pro coagulant in her profile and PH\par \par Proven colitis of descending colon, an episode after the surgery, CT is reading possibly ischemic

## 2021-11-11 ENCOUNTER — APPOINTMENT (OUTPATIENT)
Dept: OPHTHALMOLOGY | Facility: CLINIC | Age: 71
End: 2021-11-11
Payer: MEDICARE

## 2021-11-11 ENCOUNTER — NON-APPOINTMENT (OUTPATIENT)
Age: 71
End: 2021-11-11

## 2021-11-11 PROCEDURE — 92134 CPTRZ OPH DX IMG PST SGM RTA: CPT

## 2021-11-11 PROCEDURE — 92014 COMPRE OPH EXAM EST PT 1/>: CPT

## 2022-01-13 ENCOUNTER — APPOINTMENT (OUTPATIENT)
Dept: HEART AND VASCULAR | Facility: CLINIC | Age: 72
End: 2022-01-13
Payer: MEDICARE

## 2022-01-13 VITALS
HEIGHT: 62 IN | WEIGHT: 162.99 LBS | BODY MASS INDEX: 29.99 KG/M2 | TEMPERATURE: 98.2 F | DIASTOLIC BLOOD PRESSURE: 60 MMHG | SYSTOLIC BLOOD PRESSURE: 89 MMHG | HEART RATE: 65 BPM | OXYGEN SATURATION: 97 %

## 2022-01-13 PROCEDURE — 93000 ELECTROCARDIOGRAM COMPLETE: CPT

## 2022-01-13 PROCEDURE — 99215 OFFICE O/P EST HI 40 MIN: CPT

## 2022-01-13 NOTE — ASSESSMENT
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 Patient with resolved pulmonary hypertension and resolved low normal EF.   Cardiac eval above\par \par Plan:  She may proceed to surgery without further testing or intervention.  Given her low BP will hold Lisinopril for 2 days prior to surgery and resume after surgery.

## 2022-01-13 NOTE — HISTORY OF PRESENT ILLNESS
[FreeTextEntry1] : Dr Macias with history of scleroderma, pulmonary hypertension which has normalized in the past 3 years and hyperlipidemia\par \par Thoracic surgery in April, tolerated anesthesia and procedure.  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  Tolerating well.  No chest discomfort and no sig BLAIR

## 2022-01-13 NOTE — PHYSICAL EXAM
[Well Developed] : well developed [Well Nourished] : well nourished [No Acute Distress] : no acute distress [Normal Conjunctiva] : normal conjunctiva [Normal Venous Pressure] : normal venous pressure [No Carotid Bruit] : no carotid bruit [Normal S1, S2] : normal S1, S2 [No Murmur] : no murmur [No Rub] : no rub [No Gallop] : no gallop [S4] : S4 [Good Air Entry] : good air entry [No Respiratory Distress] : no respiratory distress  [Soft] : abdomen soft [Non Tender] : non-tender [No Masses/organomegaly] : no masses/organomegaly [Normal Bowel Sounds] : normal bowel sounds [Normal Gait] : normal gait [No Cyanosis] : no cyanosis [No Clubbing] : no clubbing [No Varicosities] : no varicosities [No Rash] : no rash [No Skin Lesions] : no skin lesions [Moves all extremities] : moves all extremities [No Focal Deficits] : no focal deficits [Normal Speech] : normal speech [Alert and Oriented] : alert and oriented [Normal memory] : normal memory [de-identified] : Reduced BS right base [de-identified] : non pitting edema

## 2022-01-13 NOTE — REVIEW OF SYSTEMS
[Feeling Fatigued] : feeling fatigued [SOB] : shortness of breath [Joint Swelling] : joint swelling [Joint Stiffness] : joint stiffness [Negative] : Heme/Lymph

## 2022-01-13 NOTE — REASON FOR VISIT
[FreeTextEntry1] : Dr Macias For Pre op evaluation for laryngeal surgery, with local and conscious sedation

## 2022-04-21 ENCOUNTER — NON-APPOINTMENT (OUTPATIENT)
Age: 72
End: 2022-04-21

## 2022-04-21 ENCOUNTER — APPOINTMENT (OUTPATIENT)
Dept: HEART AND VASCULAR | Facility: CLINIC | Age: 72
End: 2022-04-21
Payer: MEDICARE

## 2022-04-21 VITALS
SYSTOLIC BLOOD PRESSURE: 96 MMHG | HEART RATE: 77 BPM | WEIGHT: 167.99 LBS | DIASTOLIC BLOOD PRESSURE: 58 MMHG | TEMPERATURE: 96.7 F | HEIGHT: 62 IN | OXYGEN SATURATION: 99 % | BODY MASS INDEX: 30.91 KG/M2

## 2022-04-21 DIAGNOSIS — H26.9 UNSPECIFIED CATARACT: ICD-10-CM

## 2022-04-21 LAB
BASOPHILS # BLD AUTO: 0.08 K/UL
BASOPHILS NFR BLD AUTO: 1 %
EOSINOPHIL # BLD AUTO: 0.29 K/UL
EOSINOPHIL NFR BLD AUTO: 3.7 %
ESTIMATED AVERAGE GLUCOSE: 100 MG/DL
HBA1C MFR BLD HPLC: 5.1 %
HCT VFR BLD CALC: 43.5 %
HGB BLD-MCNC: 13.4 G/DL
IMM GRANULOCYTES NFR BLD AUTO: 0.4 %
LYMPHOCYTES # BLD AUTO: 2.02 K/UL
LYMPHOCYTES NFR BLD AUTO: 26 %
MAN DIFF?: NORMAL
MCHC RBC-ENTMCNC: 30.8 GM/DL
MCHC RBC-ENTMCNC: 30.9 PG
MCV RBC AUTO: 100.2 FL
MONOCYTES # BLD AUTO: 0.72 K/UL
MONOCYTES NFR BLD AUTO: 9.3 %
NEUTROPHILS # BLD AUTO: 4.64 K/UL
NEUTROPHILS NFR BLD AUTO: 59.6 %
PLATELET # BLD AUTO: 228 K/UL
RBC # BLD: 4.34 M/UL
RBC # FLD: 11.9 %
WBC # FLD AUTO: 7.78 K/UL

## 2022-04-21 PROCEDURE — 93000 ELECTROCARDIOGRAM COMPLETE: CPT

## 2022-04-21 PROCEDURE — 99214 OFFICE O/P EST MOD 30 MIN: CPT

## 2022-04-21 NOTE — ASSESSMENT
[FreeTextEntry1] : ECG:  Sr , no change\par \par Imp:  Scleroderma past PH, recent laryngesl surgery, high LDL\par \par Plan:  keep lisinopril, check labs, start Zetia, add Bempedoic acid\par \par ? Praluent  CTA neg.

## 2022-04-21 NOTE — HISTORY OF PRESENT ILLNESS
[FreeTextEntry1] : Clearence for laryngeal surgery and voice is 80% better,  not choking Dr Hdez head of voice unit.  success\par \par Stopped the lisinopril pre-op, now back on 5 mg, lightheaded,concerned for fall.  Reduced to 2.5 mg, in the evening\par \par PFTs post lobectomy.  24% decrease in DLCO, compared to minimal decreaes in lung volumes.  Placed on Combivent developed dry cough and now off\par \par Iron studies all normal.  \par \par 1 year f/u with thoracic surgeon in May, and f/u with guerrero.  she will do a rest echo.  \par \par Mild exertional sob.  not regularly.  Mostly up hills.  More than in the immediate post op.  \par \par LDL  209\par \par

## 2022-04-21 NOTE — REVIEW OF SYSTEMS
[SOB] : shortness of breath [Dyspnea on exertion] : dyspnea during exertion [Joint Pain] : joint pain [Negative] : Heme/Lymph

## 2022-04-22 LAB
ALBUMIN SERPL ELPH-MCNC: 4.6 G/DL
ALP BLD-CCNC: 74 U/L
ALT SERPL-CCNC: 12 U/L
ANION GAP SERPL CALC-SCNC: 14 MMOL/L
AST SERPL-CCNC: 19 U/L
BILIRUB SERPL-MCNC: 0.4 MG/DL
BUN SERPL-MCNC: 32 MG/DL
CALCIUM SERPL-MCNC: 10 MG/DL
CHLORIDE SERPL-SCNC: 102 MMOL/L
CHOLEST SERPL-MCNC: 258 MG/DL
CO2 SERPL-SCNC: 24 MMOL/L
CREAT SERPL-MCNC: 0.79 MG/DL
EGFR: 79 ML/MIN/1.73M2
GLUCOSE SERPL-MCNC: 81 MG/DL
HDLC SERPL-MCNC: 54 MG/DL
LDLC SERPL CALC-MCNC: 173 MG/DL
NONHDLC SERPL-MCNC: 204 MG/DL
POTASSIUM SERPL-SCNC: 4.8 MMOL/L
PROT SERPL-MCNC: 6.8 G/DL
SODIUM SERPL-SCNC: 140 MMOL/L
T3 SERPL-MCNC: 71 NG/DL
T4 SERPL-MCNC: 5.2 UG/DL
TRIGL SERPL-MCNC: 154 MG/DL
TSH SERPL-ACNC: 1.51 UIU/ML

## 2022-07-03 ENCOUNTER — NON-APPOINTMENT (OUTPATIENT)
Age: 72
End: 2022-07-03

## 2022-10-13 ENCOUNTER — APPOINTMENT (OUTPATIENT)
Dept: HEART AND VASCULAR | Facility: CLINIC | Age: 72
End: 2022-10-13

## 2022-10-13 ENCOUNTER — NON-APPOINTMENT (OUTPATIENT)
Age: 72
End: 2022-10-13

## 2022-10-13 VITALS
HEIGHT: 62 IN | SYSTOLIC BLOOD PRESSURE: 90 MMHG | OXYGEN SATURATION: 98 % | HEART RATE: 82 BPM | DIASTOLIC BLOOD PRESSURE: 62 MMHG | BODY MASS INDEX: 33.12 KG/M2 | TEMPERATURE: 98.1 F | WEIGHT: 179.99 LBS

## 2022-10-13 DIAGNOSIS — H04.129 DRY EYE SYNDROME OF UNSPECIFIED LACRIMAL GLAND: ICD-10-CM

## 2022-10-13 PROCEDURE — 99214 OFFICE O/P EST MOD 30 MIN: CPT

## 2022-10-13 PROCEDURE — 93000 ELECTROCARDIOGRAM COMPLETE: CPT

## 2022-10-13 NOTE — HISTORY OF PRESENT ILLNESS
[FreeTextEntry1] : Her in April\par \par Appointment with Dr. Coppola next week.  Will have PFts, will skip the echo.  CT is due November with f/u with the thoracic surgeon.\par \par On Qvar inhaler low dose steroid for small airway hyperreactivity.  \par \par Doing ok, some exertional dyspnea, mostly uphills.  Walking between 3-6 miles a day, higher 2-3 days a week.  \par \par Saw derm check, has a nodular basal cell on her chest. Will have a consult at [facility] Dr Barnard, \par \par Started Zetia alone, and halfted dose of LIsinipril.  Still dizzy, but not most of the time\par \par Tolerated Zetia fine.  LDL is 173\par \par Last EF normal\par \par No recent Fe infusions

## 2022-10-13 NOTE — ASSESSMENT
[FreeTextEntry1] : ECG:  SR, low voltage, unchanged\par \par Imp:  Dr Macias with sclerederma, CM, PH, EF normal, now,  elevated lipids and statin intolerant\par \par Plan:  Add bempedoic acid and chaeck in 3 months, will probably need injectable.  Will not add Bempedoic acid as has had major achilles issue in the past.  Will refer to Dr. Yip

## 2022-11-10 ENCOUNTER — APPOINTMENT (OUTPATIENT)
Dept: OPHTHALMOLOGY | Facility: CLINIC | Age: 72
End: 2022-11-10

## 2022-11-10 ENCOUNTER — NON-APPOINTMENT (OUTPATIENT)
Age: 72
End: 2022-11-10

## 2022-11-10 PROCEDURE — 92014 COMPRE OPH EXAM EST PT 1/>: CPT

## 2022-11-18 ENCOUNTER — NON-APPOINTMENT (OUTPATIENT)
Age: 72
End: 2022-11-18

## 2022-12-21 ENCOUNTER — NON-APPOINTMENT (OUTPATIENT)
Age: 72
End: 2022-12-21

## 2023-04-26 ENCOUNTER — NON-APPOINTMENT (OUTPATIENT)
Age: 73
End: 2023-04-26

## 2023-04-26 ENCOUNTER — APPOINTMENT (OUTPATIENT)
Dept: HEART AND VASCULAR | Facility: CLINIC | Age: 73
End: 2023-04-26
Payer: MEDICARE

## 2023-04-26 VITALS
TEMPERATURE: 98.2 F | SYSTOLIC BLOOD PRESSURE: 110 MMHG | OXYGEN SATURATION: 96 % | HEART RATE: 88 BPM | HEIGHT: 62 IN | DIASTOLIC BLOOD PRESSURE: 62 MMHG | WEIGHT: 186 LBS | BODY MASS INDEX: 34.23 KG/M2

## 2023-04-26 PROCEDURE — 99214 OFFICE O/P EST MOD 30 MIN: CPT

## 2023-04-26 PROCEDURE — 93000 ELECTROCARDIOGRAM COMPLETE: CPT

## 2023-04-26 RX ORDER — LISINOPRIL 2.5 MG/1
2.5 TABLET ORAL
Qty: 90 | Refills: 3 | Status: ACTIVE | COMMUNITY

## 2023-04-26 NOTE — REVIEW OF SYSTEMS
[Feeling Fatigued] : feeling fatigued [SOB] : shortness of breath [Dyspnea on exertion] : dyspnea during exertion [Joint Pain] : joint pain [Wheezing] : wheezing [Negative] : Heme/Lymph

## 2023-04-26 NOTE — HISTORY OF PRESENT ILLNESS
[FreeTextEntry1] : For f/u\par \par On October 13 was status quo.\par \par In November or Dec increasing BLAIR, wheezing, easy fatiguability, all sx that she has had before when she was Fe def.  Kept getting worse and now intolerable\par \par Saw Fly in October and not yet symptomatic as above, \par \par Shields retired.  Saw the new pulmonologist:  Erin on April7\par \par Had Echo at [facility]:  seems to be unchanged, did not get the PAP\par \par Echo:  EF 67%, norml thickness size and fx, normal diastolic fx, normal diastolic fx, RAP0-5\par \par PFTs:  there is a decrease in FVC, decrease in DLCO, it is down, (remember lobectomy), DLCO drifting down for a while\par \par Had Fe studies but not complete, Fe lower than its been as is Fe sat\par \par

## 2023-04-26 NOTE — ASSESSMENT
[FreeTextEntry1] : ECG:  SR, low voltage\par \par Imp:  Worsening dyspnea with DLCO and FVC decreases and probably low iron\par \par Plan:  Will obtain stress echo to eval for PAP and WMA.  Will see heme for full Fe studies and consider IV Fe.  CTA wias 1 1/2 years ago will not repeat.  To see Dr. Yip\par \par CTA in 2021 Calcium score 0 and normal cors

## 2023-05-10 ENCOUNTER — FORM ENCOUNTER (OUTPATIENT)
Age: 73
End: 2023-05-10

## 2023-05-11 ENCOUNTER — OUTPATIENT (OUTPATIENT)
Dept: OUTPATIENT SERVICES | Facility: HOSPITAL | Age: 73
LOS: 1 days | End: 2023-05-11
Payer: MEDICARE

## 2023-05-11 DIAGNOSIS — I27.20 PULMONARY HYPERTENSION, UNSPECIFIED: ICD-10-CM

## 2023-05-11 PROCEDURE — 93351 STRESS TTE COMPLETE: CPT | Mod: 26,52

## 2023-05-11 PROCEDURE — 93351 STRESS TTE COMPLETE: CPT

## 2023-06-13 ENCOUNTER — APPOINTMENT (OUTPATIENT)
Dept: HEART AND VASCULAR | Facility: CLINIC | Age: 73
End: 2023-06-13
Payer: MEDICARE

## 2023-06-13 PROCEDURE — 93228 REMOTE 30 DAY ECG REV/REPORT: CPT

## 2023-07-10 DIAGNOSIS — Z00.00 ENCOUNTER FOR GENERAL ADULT MEDICAL EXAMINATION W/OUT ABNORMAL FINDINGS: ICD-10-CM

## 2023-07-11 ENCOUNTER — NON-APPOINTMENT (OUTPATIENT)
Age: 73
End: 2023-07-11

## 2023-07-11 ENCOUNTER — APPOINTMENT (OUTPATIENT)
Dept: HEART AND VASCULAR | Facility: CLINIC | Age: 73
End: 2023-07-11
Payer: MEDICARE

## 2023-07-11 VITALS
WEIGHT: 183 LBS | HEIGHT: 62.5 IN | DIASTOLIC BLOOD PRESSURE: 61 MMHG | SYSTOLIC BLOOD PRESSURE: 106 MMHG | TEMPERATURE: 93.3 F | HEART RATE: 80 BPM | BODY MASS INDEX: 32.83 KG/M2

## 2023-07-11 PROCEDURE — 93000 ELECTROCARDIOGRAM COMPLETE: CPT

## 2023-07-11 PROCEDURE — 99204 OFFICE O/P NEW MOD 45 MIN: CPT

## 2023-07-11 NOTE — PHYSICAL EXAM
[Well Developed] : well developed [Well Nourished] : well nourished [No Acute Distress] : no acute distress [Normal Conjunctiva] : normal conjunctiva [Normal Venous Pressure] : normal venous pressure [No Carotid Bruit] : no carotid bruit [Normal S1, S2] : normal S1, S2 [No Murmur] : no murmur [No Rub] : no rub [No Gallop] : no gallop [No Respiratory Distress] : no respiratory distress  [Soft] : abdomen soft [Non Tender] : non-tender [No Masses/organomegaly] : no masses/organomegaly [Normal Bowel Sounds] : normal bowel sounds [Normal Gait] : normal gait [No Edema] : no edema [No Cyanosis] : no cyanosis [No Clubbing] : no clubbing [No Varicosities] : no varicosities [No Rash] : no rash [No Skin Lesions] : no skin lesions [Moves all extremities] : moves all extremities [No Focal Deficits] : no focal deficits [Normal Speech] : normal speech [Alert and Oriented] : alert and oriented [Normal memory] : normal memory

## 2023-07-11 NOTE — ADDENDUM
[FreeTextEntry1] : I, Beverly Esquivel, am scribing for and the presence of Dr. Villareal the following sections: HPI, PMH,Family/social history, ROS, Physical Exam, Assessment / Plan.\par \par I, Leoncio Villareal, personally performed the services described in the documentation, reviewed the documentation recorded by the scribe in my presence and it accurately and completely records my words and actions.\par

## 2023-07-11 NOTE — HISTORY OF PRESENT ILLNESS
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  \par \par She noted abrupt onset of palpitations on 7/4/23.  Apple watch tracings confirmed atrial fibrillation with rates up to the 150-160 bpm range.  Started on Eliquis for TE/CVA ppx. \par \serene Will have TFTs at [facility] later today.\par familia Notes h/o PVCs several years ago-- states she has been on Lisinopril for management of ectopy.  \par \par She endorses h/o snoring and desaturations into the 80's at night.

## 2023-07-11 NOTE — CARDIOLOGY SUMMARY
[de-identified] : 7/11/23 SR @ 63 bpm  [de-identified] : Farnaz XT 6/13 - 6/27/23:  45-, non sustained SVT (irregular) [de-identified] : EXSE 5/2023\par Normal exercise stress echo, at onset of recovery there were 2 sustained runs of SVT at ~ 150 bpm, likely atrial tachycardia, frequent PVCs in recovery, PASP post exercise 46 mmHg

## 2023-07-19 ENCOUNTER — NON-APPOINTMENT (OUTPATIENT)
Age: 73
End: 2023-07-19

## 2023-08-22 ENCOUNTER — NON-APPOINTMENT (OUTPATIENT)
Age: 73
End: 2023-08-22

## 2023-08-22 ENCOUNTER — APPOINTMENT (OUTPATIENT)
Dept: HEART AND VASCULAR | Facility: CLINIC | Age: 73
End: 2023-08-22
Payer: MEDICARE

## 2023-08-22 VITALS
WEIGHT: 183 LBS | HEIGHT: 62.5 IN | HEART RATE: 75 BPM | BODY MASS INDEX: 32.83 KG/M2 | DIASTOLIC BLOOD PRESSURE: 67 MMHG | SYSTOLIC BLOOD PRESSURE: 128 MMHG | TEMPERATURE: 97.7 F

## 2023-08-22 PROCEDURE — 99213 OFFICE O/P EST LOW 20 MIN: CPT

## 2023-08-22 PROCEDURE — 93000 ELECTROCARDIOGRAM COMPLETE: CPT

## 2023-08-22 NOTE — CARDIOLOGY SUMMARY
[de-identified] : 8/22/23 SR@ 66 bpm 7/11/23 SR @ 63 bpm  [de-identified] : Farnaz XT 6/13 - 6/27/23:  45-, non sustained SVT (irregular) [de-identified] : EXSE 5/2023\par  Normal exercise stress echo, at onset of recovery there were 2 sustained runs of SVT at ~ 150 bpm, likely atrial tachycardia, frequent PVCs in recovery, PASP post exercise 46 mmHg

## 2023-08-22 NOTE — HISTORY OF PRESENT ILLNESS
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  She initially noted onset of palpitations on 7/4/23.  Apple watch tracings confirmed atrial fibrillation with rates up to the 150-160 bpm range.  Started on Eliquis for TE/CVA ppx.   She has since obtained a fourth [employer] heart rate monitor.  Review of the strips reveal an increase in PVC burden while supine and asleep, otherwise low burden atrial and ventricular ectopy at all other times.  There was one symptomatic event during the daytime of a short run of paroxysmal AT.  no episodes of atrial fibrillation or sustained arrhythmia noted. Otherwise, she complains of ongoing dyspnea with minimal exertion which does not correlate with the burden of ectopy on her monitor results.  She is to undergo a sleep study tomorrow.

## 2023-08-23 ENCOUNTER — APPOINTMENT (OUTPATIENT)
Dept: SLEEP CENTER | Facility: HOSPITAL | Age: 73
End: 2023-08-23

## 2023-08-23 ENCOUNTER — OUTPATIENT (OUTPATIENT)
Dept: OUTPATIENT SERVICES | Facility: HOSPITAL | Age: 73
LOS: 1 days | End: 2023-08-23
Payer: MEDICARE

## 2023-08-23 DIAGNOSIS — G47.33 OBSTRUCTIVE SLEEP APNEA (ADULT) (PEDIATRIC): ICD-10-CM

## 2023-08-23 PROCEDURE — 95810 POLYSOM 6/> YRS 4/> PARAM: CPT | Mod: 26

## 2023-08-23 PROCEDURE — 95810 POLYSOM 6/> YRS 4/> PARAM: CPT

## 2023-09-14 ENCOUNTER — APPOINTMENT (OUTPATIENT)
Dept: HEART AND VASCULAR | Facility: CLINIC | Age: 73
End: 2023-09-14
Payer: MEDICARE

## 2023-09-14 ENCOUNTER — NON-APPOINTMENT (OUTPATIENT)
Age: 73
End: 2023-09-14

## 2023-09-14 VITALS
BODY MASS INDEX: 32.29 KG/M2 | WEIGHT: 179.99 LBS | SYSTOLIC BLOOD PRESSURE: 102 MMHG | HEART RATE: 92 BPM | HEIGHT: 62.5 IN | OXYGEN SATURATION: 98 % | DIASTOLIC BLOOD PRESSURE: 68 MMHG | TEMPERATURE: 97 F

## 2023-09-14 DIAGNOSIS — M34.9 SYSTEMIC SCLEROSIS, UNSPECIFIED: ICD-10-CM

## 2023-09-14 DIAGNOSIS — C34.90 MALIGNANT NEOPLASM OF UNSPECIFIED PART OF UNSPECIFIED BRONCHUS OR LUNG: ICD-10-CM

## 2023-09-14 DIAGNOSIS — I27.20 PULMONARY HYPERTENSION, UNSPECIFIED: ICD-10-CM

## 2023-09-14 PROCEDURE — 99214 OFFICE O/P EST MOD 30 MIN: CPT

## 2023-09-14 PROCEDURE — 93000 ELECTROCARDIOGRAM COMPLETE: CPT

## 2023-09-14 RX ORDER — FAMOTIDINE 20 MG/1
20 TABLET, FILM COATED ORAL
Refills: 0 | Status: ACTIVE | COMMUNITY

## 2023-09-14 RX ORDER — DENOSUMAB 60 MG/ML
60 INJECTION SUBCUTANEOUS
Refills: 0 | Status: ACTIVE | COMMUNITY

## 2023-09-14 RX ORDER — APIXABAN 5 MG/1
5 TABLET, FILM COATED ORAL
Qty: 180 | Refills: 3 | Status: ACTIVE | COMMUNITY
Start: 2023-07-10 | End: 1900-01-01

## 2023-09-21 ENCOUNTER — APPOINTMENT (OUTPATIENT)
Dept: HEART AND VASCULAR | Facility: CLINIC | Age: 73
End: 2023-09-21
Payer: MEDICARE

## 2023-09-21 PROCEDURE — 93228 REMOTE 30 DAY ECG REV/REPORT: CPT

## 2023-11-07 ENCOUNTER — APPOINTMENT (OUTPATIENT)
Dept: OPHTHALMOLOGY | Facility: CLINIC | Age: 73
End: 2023-11-07
Payer: MEDICARE

## 2023-11-07 ENCOUNTER — NON-APPOINTMENT (OUTPATIENT)
Age: 73
End: 2023-11-07

## 2023-11-07 PROCEDURE — 92014 COMPRE OPH EXAM EST PT 1/>: CPT

## 2024-01-11 ENCOUNTER — NON-APPOINTMENT (OUTPATIENT)
Age: 74
End: 2024-01-11

## 2024-01-11 ENCOUNTER — APPOINTMENT (OUTPATIENT)
Dept: HEART AND VASCULAR | Facility: CLINIC | Age: 74
End: 2024-01-11
Payer: MEDICARE

## 2024-01-11 VITALS
HEIGHT: 62.5 IN | DIASTOLIC BLOOD PRESSURE: 70 MMHG | SYSTOLIC BLOOD PRESSURE: 100 MMHG | TEMPERATURE: 98.5 F | WEIGHT: 176 LBS | BODY MASS INDEX: 31.58 KG/M2 | OXYGEN SATURATION: 98 % | HEART RATE: 72 BPM

## 2024-01-11 DIAGNOSIS — E78.2 MIXED HYPERLIPIDEMIA: ICD-10-CM

## 2024-01-11 DIAGNOSIS — I48.0 PAROXYSMAL ATRIAL FIBRILLATION: ICD-10-CM

## 2024-01-11 DIAGNOSIS — R06.09 OTHER FORMS OF DYSPNEA: ICD-10-CM

## 2024-01-11 PROCEDURE — 99214 OFFICE O/P EST MOD 30 MIN: CPT

## 2024-01-11 PROCEDURE — 93000 ELECTROCARDIOGRAM COMPLETE: CPT

## 2024-01-11 RX ORDER — APIXABAN 5 MG/1
5 TABLET, FILM COATED ORAL
Qty: 180 | Refills: 3 | Status: ACTIVE | COMMUNITY
Start: 2024-01-11 | End: 1900-01-01

## 2024-01-11 NOTE — HISTORY OF PRESENT ILLNESS
[FreeTextEntry1] : Last seen in September, second holter 14 days,   Atrial Tachycardia 15 episodes short bursts looks like afib  1 episode VT  16 beats,   Got Novavax, showers of PVCs 10 days later  Saw Juan Mancilla regarding Scleraderma and then saw Radha Suarez :  normal EF heart failue and mild PH, BNP normal and cardiacMRI,Normal with tiny scarring no fibrotic infiltration.    Put on farxiga and exertional dyspnea may be better and wants to order a CPX at [facility]  Changed food intake and not taking in food after 6pm, due to her esophagus and has lost 8lbs  Wants to taper Seroquel with the psycho pharmacologist

## 2024-01-11 NOTE — ASSESSMENT
[FreeTextEntry1] : ECG:  SR low voltage  Imp:  73 with scleroderma, mild elevation of PAP, with PAF and also VT and atrial tacycardia  Plan:  may need to consider ablation, will see EP, NSVT Mg?  f

## 2024-02-04 ENCOUNTER — NON-APPOINTMENT (OUTPATIENT)
Age: 74
End: 2024-02-04

## 2024-03-19 ENCOUNTER — APPOINTMENT (OUTPATIENT)
Dept: HEART AND VASCULAR | Facility: CLINIC | Age: 74
End: 2024-03-19

## 2024-11-15 ENCOUNTER — NON-APPOINTMENT (OUTPATIENT)
Age: 74
End: 2024-11-15

## 2024-11-15 ENCOUNTER — APPOINTMENT (OUTPATIENT)
Dept: HEART AND VASCULAR | Facility: CLINIC | Age: 74
End: 2024-11-15
Payer: MEDICARE

## 2024-11-15 VITALS
DIASTOLIC BLOOD PRESSURE: 70 MMHG | BODY MASS INDEX: 29.24 KG/M2 | SYSTOLIC BLOOD PRESSURE: 92 MMHG | TEMPERATURE: 98.2 F | WEIGHT: 162.99 LBS | HEIGHT: 62.5 IN | OXYGEN SATURATION: 99 % | HEART RATE: 78 BPM

## 2024-11-15 DIAGNOSIS — M34.9 SYSTEMIC SCLEROSIS, UNSPECIFIED: ICD-10-CM

## 2024-11-15 DIAGNOSIS — E78.2 MIXED HYPERLIPIDEMIA: ICD-10-CM

## 2024-11-15 DIAGNOSIS — C34.90 MALIGNANT NEOPLASM OF UNSPECIFIED PART OF UNSPECIFIED BRONCHUS OR LUNG: ICD-10-CM

## 2024-11-15 DIAGNOSIS — R06.09 OTHER FORMS OF DYSPNEA: ICD-10-CM

## 2024-11-15 PROCEDURE — 93000 ELECTROCARDIOGRAM COMPLETE: CPT

## 2024-11-15 PROCEDURE — 99213 OFFICE O/P EST LOW 20 MIN: CPT

## 2024-11-15 RX ORDER — EZETIMIBE 10 MG/1
10 TABLET ORAL DAILY
Refills: 0 | Status: ACTIVE | COMMUNITY

## 2024-11-15 RX ORDER — TOPIRAMATE 25 MG/1
25 TABLET, FILM COATED ORAL
Refills: 0 | Status: ACTIVE | COMMUNITY

## 2024-11-21 ENCOUNTER — APPOINTMENT (OUTPATIENT)
Dept: OPHTHALMOLOGY | Facility: CLINIC | Age: 74
End: 2024-11-21
Payer: MEDICARE

## 2024-11-21 ENCOUNTER — NON-APPOINTMENT (OUTPATIENT)
Age: 74
End: 2024-11-21

## 2024-11-21 PROCEDURE — 92014 COMPRE OPH EXAM EST PT 1/>: CPT

## 2024-11-21 PROCEDURE — 92134 CPTRZ OPH DX IMG PST SGM RTA: CPT

## 2024-12-19 ENCOUNTER — NON-APPOINTMENT (OUTPATIENT)
Age: 74
End: 2024-12-19

## 2024-12-19 ENCOUNTER — APPOINTMENT (OUTPATIENT)
Dept: OPHTHALMOLOGY | Facility: CLINIC | Age: 74
End: 2024-12-19
Payer: MEDICARE

## 2024-12-19 PROCEDURE — 92083 EXTENDED VISUAL FIELD XM: CPT

## 2024-12-19 PROCEDURE — 92060 SENSORIMOTOR EXAMINATION: CPT

## 2024-12-19 PROCEDURE — 92250 FUNDUS PHOTOGRAPHY W/I&R: CPT

## 2024-12-19 PROCEDURE — 92014 COMPRE OPH EXAM EST PT 1/>: CPT

## 2025-02-27 ENCOUNTER — NON-APPOINTMENT (OUTPATIENT)
Age: 75
End: 2025-02-27

## 2025-02-27 ENCOUNTER — APPOINTMENT (OUTPATIENT)
Dept: OPHTHALMOLOGY | Facility: CLINIC | Age: 75
End: 2025-02-27
Payer: MEDICARE

## 2025-02-27 PROCEDURE — 92012 INTRM OPH EXAM EST PATIENT: CPT

## 2025-02-27 PROCEDURE — 92083 EXTENDED VISUAL FIELD XM: CPT
